# Patient Record
Sex: FEMALE
[De-identification: names, ages, dates, MRNs, and addresses within clinical notes are randomized per-mention and may not be internally consistent; named-entity substitution may affect disease eponyms.]

---

## 2021-03-05 PROBLEM — Z00.00 ENCOUNTER FOR PREVENTIVE HEALTH EXAMINATION: Status: ACTIVE | Noted: 2021-03-05

## 2021-03-22 ENCOUNTER — APPOINTMENT (OUTPATIENT)
Dept: OBGYN | Facility: CLINIC | Age: 31
End: 2021-03-22
Payer: MEDICAID

## 2021-03-22 VITALS
WEIGHT: 166.8 LBS | TEMPERATURE: 98.4 F | HEIGHT: 65 IN | BODY MASS INDEX: 27.79 KG/M2 | DIASTOLIC BLOOD PRESSURE: 80 MMHG | SYSTOLIC BLOOD PRESSURE: 132 MMHG

## 2021-03-22 DIAGNOSIS — Z01.419 ENCOUNTER FOR GYNECOLOGICAL EXAMINATION (GENERAL) (ROUTINE) W/OUT ABNORMAL FINDINGS: ICD-10-CM

## 2021-03-22 DIAGNOSIS — R53.83 OTHER FATIGUE: ICD-10-CM

## 2021-03-22 DIAGNOSIS — Z80.6 FAMILY HISTORY OF LEUKEMIA: ICD-10-CM

## 2021-03-22 DIAGNOSIS — Z83.3 FAMILY HISTORY OF DIABETES MELLITUS: ICD-10-CM

## 2021-03-22 DIAGNOSIS — Z87.440 PERSONAL HISTORY OF URINARY (TRACT) INFECTIONS: ICD-10-CM

## 2021-03-22 DIAGNOSIS — Z86.59 PERSONAL HISTORY OF OTHER MENTAL AND BEHAVIORAL DISORDERS: ICD-10-CM

## 2021-03-22 PROCEDURE — 99204 OFFICE O/P NEW MOD 45 MIN: CPT

## 2021-03-22 PROCEDURE — 36410 VNPNXR 3YR/> PHY/QHP DX/THER: CPT

## 2021-03-22 PROCEDURE — 99072 ADDL SUPL MATRL&STAF TM PHE: CPT

## 2021-03-22 NOTE — PHYSICAL EXAM
[Appropriately responsive] : appropriately responsive [Alert] : alert [No Acute Distress] : no acute distress [No Lymphadenopathy] : no lymphadenopathy [Soft] : soft [Non-tender] : non-tender [Non-distended] : non-distended [No HSM] : No HSM [No Lesions] : no lesions [No Mass] : no mass [Oriented x3] : oriented x3 [Examination Of The Breasts] : a normal appearance [No Discharge] : no discharge [No Masses] : no breast masses were palpable [Labia Majora] : normal [Labia Minora] : normal [Normal] : normal [Uterine Adnexae] : normal [FreeTextEntry3] : no thyroid nodules palpated [Tenderness] : nontender [Enlarged ___ wks] : not enlarged

## 2021-03-22 NOTE — DISCUSSION/SUMMARY
[FreeTextEntry1] : 29 yo here for well woman exam, recent fatigue, hx of hashimoto's.\par 1) Health maintenance: \par Pap + HPV, GC/CT\par Declines STI testing\par Declines Gardasil\par \par 2) Fatigue, hx of hashimoto's\par -CBC, BMP, TSH today\par -Referral given for PCP and endocrinologist for further management

## 2021-03-22 NOTE — HISTORY OF PRESENT ILLNESS
[Patient reported PAP Smear was normal] : Patient reported PAP Smear was normal [HIV test declined] : HIV test declined [Syphilis test declined] : Syphilis test declined [Hepatitis B test declined] : Hepatitis B test declined [Hepatitis C test declined] : Hepatitis C test declined [Y] : Patient uses contraception [Condoms] : uses condoms [N] : Patient denies prior pregnancies [PapSmeardate] : 5 yrs ago [LMPDate] : 2/27/21 [MensesFreq] : 28 [MensesLength] : 3-5 [MensesAmount] : normal [FreeTextEntry1] : Denies cysts, fibroids, hx of abnormal pap 2009 resolved, denies STI. Declines Gardasil vaccine. Not currently sexually active. Lifetime partners: 10

## 2021-03-23 ENCOUNTER — TRANSCRIPTION ENCOUNTER (OUTPATIENT)
Age: 31
End: 2021-03-23

## 2021-03-23 LAB
ANION GAP SERPL CALC-SCNC: 16 MMOL/L
BASOPHILS # BLD AUTO: 0.04 K/UL
BASOPHILS NFR BLD AUTO: 0.8 %
BUN SERPL-MCNC: 12 MG/DL
CALCIUM SERPL-MCNC: 9.5 MG/DL
CHLORIDE SERPL-SCNC: 104 MMOL/L
CO2 SERPL-SCNC: 20 MMOL/L
CREAT SERPL-MCNC: 0.78 MG/DL
EOSINOPHIL # BLD AUTO: 0.07 K/UL
EOSINOPHIL NFR BLD AUTO: 1.5 %
GLUCOSE SERPL-MCNC: 57 MG/DL
HCT VFR BLD CALC: 40.8 %
HGB BLD-MCNC: 13.3 G/DL
IMM GRANULOCYTES NFR BLD AUTO: 0.2 %
LYMPHOCYTES # BLD AUTO: 1.41 K/UL
LYMPHOCYTES NFR BLD AUTO: 29.4 %
MAN DIFF?: NORMAL
MCHC RBC-ENTMCNC: 30.6 PG
MCHC RBC-ENTMCNC: 32.6 GM/DL
MCV RBC AUTO: 94 FL
MONOCYTES # BLD AUTO: 0.38 K/UL
MONOCYTES NFR BLD AUTO: 7.9 %
NEUTROPHILS # BLD AUTO: 2.89 K/UL
NEUTROPHILS NFR BLD AUTO: 60.2 %
PLATELET # BLD AUTO: 230 K/UL
POTASSIUM SERPL-SCNC: 4.9 MMOL/L
RBC # BLD: 4.34 M/UL
RBC # FLD: 11.9 %
SODIUM SERPL-SCNC: 140 MMOL/L
TSH SERPL-ACNC: 3.16 UIU/ML
WBC # FLD AUTO: 4.8 K/UL

## 2021-03-24 LAB
C TRACH RRNA SPEC QL NAA+PROBE: NOT DETECTED
HPV HIGH+LOW RISK DNA PNL CVX: NOT DETECTED
N GONORRHOEA RRNA SPEC QL NAA+PROBE: NOT DETECTED
SOURCE TP AMPLIFICATION: NORMAL

## 2021-03-29 LAB — CYTOLOGY CVX/VAG DOC THIN PREP: NORMAL

## 2021-07-21 ENCOUNTER — APPOINTMENT (OUTPATIENT)
Dept: OBGYN | Facility: CLINIC | Age: 31
End: 2021-07-21
Payer: MEDICAID

## 2021-07-21 VITALS
BODY MASS INDEX: 24.72 KG/M2 | SYSTOLIC BLOOD PRESSURE: 106 MMHG | WEIGHT: 148.38 LBS | HEIGHT: 65 IN | DIASTOLIC BLOOD PRESSURE: 68 MMHG

## 2021-07-21 DIAGNOSIS — Z30.09 ENCOUNTER FOR OTHER GENERAL COUNSELING AND ADVICE ON CONTRACEPTION: ICD-10-CM

## 2021-07-21 LAB
HCG UR QL: NEGATIVE
QUALITY CONTROL: YES

## 2021-07-21 PROCEDURE — 99213 OFFICE O/P EST LOW 20 MIN: CPT

## 2021-07-21 PROCEDURE — 81025 URINE PREGNANCY TEST: CPT

## 2021-07-21 NOTE — HISTORY OF PRESENT ILLNESS
[FreeTextEntry1] : Pt here to discuss contraception options. Hx of taking OCP in past, stopped in her 20's, states having anxiety, hair growth on her face. Would like to discuss nonhormonal contraception options. \par \par Discussed risks and benefits of all options, including abstinence, rhythm method, condoms, phexxi, OCP, POPs, patch, ring, Depo, progesterone and copper IUDs, Nexplanon. Discussed possible side effects of all methods. Handouts were provided. She is considering copper IUD. Discussed details of procedure, risks of procedure, and postprocedure expectations, restrictions, and follow-up.

## 2021-07-21 NOTE — DISCUSSION/SUMMARY
[FreeTextEntry1] : 32 yo here for contraception counseling. \par -All methods discussed, pt considering her options\par -Handouts provided\par -Return as needed

## 2021-08-03 ENCOUNTER — TRANSCRIPTION ENCOUNTER (OUTPATIENT)
Age: 31
End: 2021-08-03

## 2021-08-10 LAB — HEMOGLOBIN: 7.2

## 2021-08-11 ENCOUNTER — TRANSCRIPTION ENCOUNTER (OUTPATIENT)
Age: 31
End: 2021-08-11

## 2021-08-17 ENCOUNTER — APPOINTMENT (OUTPATIENT)
Dept: FAMILY MEDICINE | Facility: CLINIC | Age: 31
End: 2021-08-17

## 2021-08-31 ENCOUNTER — NON-APPOINTMENT (OUTPATIENT)
Age: 31
End: 2021-08-31

## 2021-08-31 ENCOUNTER — TRANSCRIPTION ENCOUNTER (OUTPATIENT)
Age: 31
End: 2021-08-31

## 2021-09-01 ENCOUNTER — NON-APPOINTMENT (OUTPATIENT)
Age: 31
End: 2021-09-01

## 2021-09-01 ENCOUNTER — APPOINTMENT (OUTPATIENT)
Dept: FAMILY MEDICINE | Facility: CLINIC | Age: 31
End: 2021-09-01
Payer: MEDICAID

## 2021-09-01 VITALS
HEIGHT: 65 IN | SYSTOLIC BLOOD PRESSURE: 120 MMHG | RESPIRATION RATE: 14 BRPM | HEART RATE: 87 BPM | OXYGEN SATURATION: 98 % | BODY MASS INDEX: 24.66 KG/M2 | WEIGHT: 148 LBS | DIASTOLIC BLOOD PRESSURE: 80 MMHG

## 2021-09-01 VITALS — TEMPERATURE: 97.6 F

## 2021-09-01 DIAGNOSIS — R20.2 PARESTHESIA OF SKIN: ICD-10-CM

## 2021-09-01 DIAGNOSIS — Z86.39 PERSONAL HISTORY OF OTHER ENDOCRINE, NUTRITIONAL AND METABOLIC DISEASE: ICD-10-CM

## 2021-09-01 DIAGNOSIS — Z83.3 FAMILY HISTORY OF DIABETES MELLITUS: ICD-10-CM

## 2021-09-01 DIAGNOSIS — Z78.9 OTHER SPECIFIED HEALTH STATUS: ICD-10-CM

## 2021-09-01 DIAGNOSIS — F41.9 ANXIETY DISORDER, UNSPECIFIED: ICD-10-CM

## 2021-09-01 DIAGNOSIS — H53.8 OTHER VISUAL DISTURBANCES: ICD-10-CM

## 2021-09-01 DIAGNOSIS — Z76.89 PERSONS ENCOUNTERING HEALTH SERVICES IN OTHER SPECIFIED CIRCUMSTANCES: ICD-10-CM

## 2021-09-01 DIAGNOSIS — F98.8 OTHER SPECIFIED BEHAVIORAL AND EMOTIONAL DISORDERS WITH ONSET USUALLY OCCURRING IN CHILDHOOD AND ADOLESCENCE: ICD-10-CM

## 2021-09-01 DIAGNOSIS — R07.89 OTHER CHEST PAIN: ICD-10-CM

## 2021-09-01 DIAGNOSIS — Z82.49 FAMILY HISTORY OF ISCHEMIC HEART DISEASE AND OTHER DISEASES OF THE CIRCULATORY SYSTEM: ICD-10-CM

## 2021-09-01 PROCEDURE — 93000 ELECTROCARDIOGRAM COMPLETE: CPT | Mod: 59

## 2021-09-01 PROCEDURE — G0444 DEPRESSION SCREEN ANNUAL: CPT | Mod: 59

## 2021-09-01 PROCEDURE — 99204 OFFICE O/P NEW MOD 45 MIN: CPT | Mod: 25

## 2021-09-01 PROCEDURE — 96127 BRIEF EMOTIONAL/BEHAV ASSMT: CPT | Mod: 59

## 2021-09-01 RX ORDER — HYDROXYZINE PAMOATE 25 MG/1
25 CAPSULE ORAL
Qty: 120 | Refills: 0 | Status: ACTIVE | COMMUNITY
Start: 2021-09-01 | End: 1900-01-01

## 2021-09-01 NOTE — HEALTH RISK ASSESSMENT
[0] : 1) Little interest or pleasure doing things: Not at all (0) [1] : 2) Feeling down, depressed, or hopeless for several days (1) [Not at All (0)] : 7.) Trouble concentrating on things, such as reading a newspaper or watching television? Not at all [Several Days (1)] : 8.) Moving or speaking so slowly that other people could have noticed, or the opposite, moving or speaking faster than usual? Several days [Mild] : severity of depression is mild [Somewhat Difficult] : How difficult have these problems made it for you to do your work, take care of things at home, or get along with people? Somewhat difficult [PHQ-9 Positive] : PHQ-9 Positive [I have developed a follow-up plan documented below in the note.] : I have developed a follow-up plan documented below in the note. [[id number]] : 5

## 2021-09-01 NOTE — REVIEW OF SYSTEMS
[Chest Pain] : chest pain [Fever] : no fever [Chills] : no chills [Shortness Of Breath] : no shortness of breath [Cough] : no cough

## 2021-09-01 NOTE — PHYSICAL EXAM
[No Lymphadenopathy] : no lymphadenopathy [Supple] : supple [Normal] : no respiratory distress, lungs were clear to auscultation bilaterally and no accessory muscle use [Normal Rate] : normal rate  [Regular Rhythm] : with a regular rhythm [Normal S1, S2] : normal S1 and S2 [No Edema] : there was no peripheral edema [No Focal Deficits] : no focal deficits [Normal Insight/Judgement] : insight and judgment were intact [de-identified] : +slight murmur [de-identified] : no calf tenderness b/l LE [de-identified] : CN 2-12 INTACT, NORMAL STRENGTH UPPER AND LOWER EXT B/L 5/5, NORMAL RAPID ALTERNATING MOVEMENTS AND FINGER TO NOSE [de-identified] : seems anxious

## 2021-09-01 NOTE — PLAN
[FreeTextEntry1] : \par est care\par \par anxiety\par YOANA-7\par PHQ9=5\par istop \par Reference #: [id number], no prescriptions for xanax found\par advised to contimue therapy\par advised an ssri would be a good fit for her anxiety as it can help stabilize the anxiety as opposed to xanax which is short acting and short lived, she declined ssri for now\par given hydroxyzine 25mg 1-2tabs as needed bid advised do not drive after use as it could make her tired \par psychiatrist consult for possible benzos but i advised against this due to the addictive nature of the medication and its short lived effects \par \par intermittent atypical chest pain since she was in high school, only with anxiety r/o cardiac due to nonspecific t wave abnormalities\par cardio consult\par -EKG- NSR @ 71  BPM, NORMAL AXIS, NORMAL NJ/QT INTERVAL,FLAT T WAVE AVL AND SLIGHTLY INVERTED V2\par EKG reviewed\par advised if worsening chest pain occurs to go to ed she understood and agreed w/plan \par \par whole body paresthesia and headache and blurry vision r/o MS vs b12 def \par mri brain/mra\par b12 deficiency? - she will see if she had this drawn w/endocrine recently, if she did she will send result, if not she will let me know and we will perform this test\par recent tfts normal as per pt with endocrine\par \par f/u for cpe within 1 month for follow up as well pt understood and agreed w/plan

## 2021-09-01 NOTE — HISTORY OF PRESENT ILLNESS
[FreeTextEntry1] : patient presents for establish care\par  [de-identified] : 32yo F presents for est care and recent episode of whole body tingling/ headache , anxiety and chest pain \par \par a few weeks ago she thought she had a mini stroke\par she waswalking from the bathroom and had some blurry vision and floaters and she  had a headache  but she said it was a tension headache\par she started getting tingling in her hands and feet and down her body and in her head and eyes and inside her mouth for a few minutes and she felt whoozy and nauseous \par she thought it was a migraine but she doesn’t have a hx of migraines\par she is used to panic attacks but this felt different\par she went to the ED - [facility] \par she said it took 3hrs to see an MD and so she left\par she didn’t have imaging done \par she never had whole body tingling prior to this\par \par \par she said she has a hx of anxiety\par her mother  recently of cancer\par \par since college she hasn’t been on any medication for anxiety or depression\par she said she used to take xanax for flying but then she had to take it for job interviews \par she cant sleep, has a headache, heart racing , increased bowel movements \par when she is flying she has the same sort of symptoms\par \par she said she started a [employer] job and looking for another job\par she said she has chest tightness right now bc she just came from work\par \par she [place] to live in the  and she said she doesn’t need xanax much \par she uses meditation, exercise, walks, takes cbd, deep breathing and still has anxiety \par \par c/o  left chest  pressure/ pain,    2-7 /10 intermittent   , alleviated by laying down she hasn’t tried anything else but deep breep breathing can sometimes help if she is laying down   , aggravated by   nothing \par denies chest pain with exertion\par she has had this since high school and it is the same chest pain, she said it only happens with anxiety \par she said if something can pull her out of the anxety she can feel better \par usually she has a tension headache when she has chest pain \par

## 2021-09-07 ENCOUNTER — RESULT REVIEW (OUTPATIENT)
Age: 31
End: 2021-09-07

## 2021-09-08 ENCOUNTER — TRANSCRIPTION ENCOUNTER (OUTPATIENT)
Age: 31
End: 2021-09-08

## 2021-09-08 ENCOUNTER — NON-APPOINTMENT (OUTPATIENT)
Age: 31
End: 2021-09-08

## 2021-09-08 ENCOUNTER — APPOINTMENT (OUTPATIENT)
Dept: OBGYN | Facility: CLINIC | Age: 31
End: 2021-09-08

## 2021-09-17 ENCOUNTER — APPOINTMENT (OUTPATIENT)
Dept: MRI IMAGING | Facility: CLINIC | Age: 31
End: 2021-09-17

## 2021-09-17 ENCOUNTER — APPOINTMENT (OUTPATIENT)
Dept: MRI IMAGING | Facility: HOSPITAL | Age: 31
End: 2021-09-17

## 2021-09-17 ENCOUNTER — OUTPATIENT (OUTPATIENT)
Dept: OUTPATIENT SERVICES | Facility: HOSPITAL | Age: 31
LOS: 1 days | End: 2021-09-17
Payer: MEDICAID

## 2021-09-17 PROCEDURE — 70553 MRI BRAIN STEM W/O & W/DYE: CPT | Mod: 26

## 2021-09-17 PROCEDURE — 70544 MR ANGIOGRAPHY HEAD W/O DYE: CPT

## 2021-09-17 PROCEDURE — 70553 MRI BRAIN STEM W/O & W/DYE: CPT

## 2021-09-17 PROCEDURE — A9585: CPT

## 2021-09-17 PROCEDURE — 70544 MR ANGIOGRAPHY HEAD W/O DYE: CPT | Mod: 26,76,59

## 2021-09-20 ENCOUNTER — TRANSCRIPTION ENCOUNTER (OUTPATIENT)
Age: 31
End: 2021-09-20

## 2022-02-03 ENCOUNTER — APPOINTMENT (OUTPATIENT)
Dept: OBGYN | Facility: CLINIC | Age: 32
End: 2022-02-03
Payer: MEDICAID

## 2022-02-03 VITALS
TEMPERATURE: 97.9 F | SYSTOLIC BLOOD PRESSURE: 110 MMHG | HEIGHT: 65 IN | BODY MASS INDEX: 21.66 KG/M2 | WEIGHT: 130 LBS | DIASTOLIC BLOOD PRESSURE: 62 MMHG | RESPIRATION RATE: 16 BRPM

## 2022-02-03 DIAGNOSIS — R10.2 PELVIC AND PERINEAL PAIN: ICD-10-CM

## 2022-02-03 DIAGNOSIS — Z11.3 ENCOUNTER FOR SCREENING FOR INFECTIONS WITH A PREDOMINANTLY SEXUAL MODE OF TRANSMISSION: ICD-10-CM

## 2022-02-03 LAB
BILIRUB UR QL STRIP: NORMAL
CLARITY UR: CLEAR
COLLECTION METHOD: NORMAL
GLUCOSE UR-MCNC: NORMAL
HCG UR QL: 0.2 EU/DL
HCG UR QL: NEGATIVE
HGB UR QL STRIP.AUTO: NORMAL
KETONES UR-MCNC: NORMAL
LEUKOCYTE ESTERASE UR QL STRIP: NORMAL
NITRITE UR QL STRIP: NORMAL
PH UR STRIP: 7.5
PROT UR STRIP-MCNC: NORMAL
QUALITY CONTROL: YES
SP GR UR STRIP: 1.01

## 2022-02-03 PROCEDURE — 81025 URINE PREGNANCY TEST: CPT

## 2022-02-03 PROCEDURE — 81003 URINALYSIS AUTO W/O SCOPE: CPT | Mod: QW

## 2022-02-03 PROCEDURE — 36415 COLL VENOUS BLD VENIPUNCTURE: CPT

## 2022-02-03 PROCEDURE — 99213 OFFICE O/P EST LOW 20 MIN: CPT | Mod: 25

## 2022-02-03 NOTE — PHYSICAL EXAM
[Chaperone Present] : A chaperone was present in the examining room during all aspects of the physical examination [FreeTextEntry1] : ASHLEY Silverman  [Appropriately responsive] : appropriately responsive [Alert] : alert [No Acute Distress] : no acute distress [No Lymphadenopathy] : no lymphadenopathy [Soft] : soft [Non-tender] : non-tender [Non-distended] : non-distended [No HSM] : No HSM [No Lesions] : no lesions [No Mass] : no mass [Oriented x3] : oriented x3 [FreeTextEntry7] : small palpable mobile lymph nodes bilateral groin [Examination Of The Breasts] : a normal appearance [No Masses] : no breast masses were palpable [Labia Majora] : normal [Labia Minora] : normal [Normal] : normal [Tenderness] : nontender [Enlarged ___ wks] : not enlarged [Uterine Adnexae] : normal

## 2022-02-03 NOTE — PROCEDURE
[Pelvic Pain] : pelvic pain [Transvaginal Ultrasound] : transvaginal ultrasound [Retroverted] : retroverted [FreeTextEntry5] : 6.2x3.9x3.7cm, EE 2.4mm, no free fluid in cul de sac [FreeTextEntry7] : 2.6x2.2cm with multiple small follicles largest 10mm [FreeTextEntry8] : 3.3x1.7cm with multiple small follicles largest 13mm [FreeTextEntry4] : Normal pelvic ultrasound\par Multiple small follicles in ovaries bilaterally

## 2022-02-03 NOTE — HISTORY OF PRESENT ILLNESS
[FreeTextEntry1] : Pt here for evaluation.  c/o LLQ discomfort/pain in December, states was mild but persistent so had her concerned. Has now resolved. Also reports feeling some lumps in her groin bilaterally. Denies urinary symptoms. Denies GI symptoms. Started OCP in October, states feeling well and denies any side effects. She has not had menses while on OCP, occasional spotting. \par \par No changes in sexual partners but new partner since last visit, desires STI testing today. \par \par Given her mother  from cancer, she get concerned when she feels symptoms in her pelvis.

## 2022-02-03 NOTE — DISCUSSION/SUMMARY
[FreeTextEntry1] : 30 yo with LLQ pain now resolved, normal pelvic ultrasound. \par 1) LLQ pain: \par Reassurance given, discussed possible etiology of ruptured cyst, resolving cyst, constipation. Her symptoms have currently resolved\par Udip negative\par \par 2) Groin lymph nodes: \par Small, mobile normal lymph nodes\par Reassurance given\par \par 3) Amenorrhea on OCP: \par Discussed this is a possible side effect of OCP use\par Urine pregnancy test negative\par Continue OCP as she is happy with this method\par \par REturn in 2 months for annual

## 2022-02-04 LAB — HIV1+2 AB SPEC QL IA.RAPID: NONREACTIVE

## 2022-02-05 ENCOUNTER — TRANSCRIPTION ENCOUNTER (OUTPATIENT)
Age: 32
End: 2022-02-05

## 2022-02-05 LAB
C TRACH RRNA SPEC QL NAA+PROBE: NOT DETECTED
HBV SURFACE AG SER QL: NONREACTIVE
HCV AB SER QL: NONREACTIVE
HCV S/CO RATIO: 0.14 S/CO
N GONORRHOEA RRNA SPEC QL NAA+PROBE: NOT DETECTED
SOURCE AMPLIFICATION: NORMAL
T PALLIDUM AB SER QL IA: NEGATIVE

## 2022-09-02 ENCOUNTER — NON-APPOINTMENT (OUTPATIENT)
Age: 32
End: 2022-09-02

## 2022-09-02 RX ORDER — NORETHINDRONE ACETATE AND ETHINYL ESTRADIOL, ETHINYL ESTRADIOL AND FERROUS FUMARATE 1MG-10(24)
1 MG-10 MCG / KIT ORAL DAILY
Qty: 3 | Refills: 3 | Status: ACTIVE | COMMUNITY
Start: 2021-08-31 | End: 1900-01-01

## 2022-09-06 ENCOUNTER — RX RENEWAL (OUTPATIENT)
Age: 32
End: 2022-09-06

## 2022-09-07 ENCOUNTER — RX RENEWAL (OUTPATIENT)
Age: 32
End: 2022-09-07